# Patient Record
Sex: FEMALE | Race: WHITE | Employment: OTHER | URBAN - METROPOLITAN AREA
[De-identification: names, ages, dates, MRNs, and addresses within clinical notes are randomized per-mention and may not be internally consistent; named-entity substitution may affect disease eponyms.]

---

## 2017-07-20 PROBLEM — M54.2 NECK PAIN: Status: ACTIVE | Noted: 2017-07-20

## 2017-07-20 PROBLEM — M54.12 CERVICAL RADICULOPATHY: Status: ACTIVE | Noted: 2017-07-20

## 2017-08-24 ENCOUNTER — HOSPITAL ENCOUNTER (OUTPATIENT)
Dept: MRI IMAGING | Age: 47
Discharge: HOME | End: 2017-08-24
Attending: HOSPITALIST
Payer: COMMERCIAL

## 2017-08-24 ENCOUNTER — APPOINTMENT (OUTPATIENT)
Dept: MRI IMAGING | Age: 47
End: 2017-08-24
Attending: HOSPITALIST
Payer: COMMERCIAL

## 2017-08-24 DIAGNOSIS — M54.2 NECK PAIN: ICD-10-CM

## 2017-08-24 PROCEDURE — 72141 MRI NECK SPINE W/O DYE: CPT

## 2017-08-31 ENCOUNTER — HOSPITAL ENCOUNTER (OUTPATIENT)
Dept: CT IMAGING | Age: 47
Discharge: HOME | End: 2017-08-31
Attending: HOSPITALIST
Payer: COMMERCIAL

## 2017-08-31 ENCOUNTER — HOSPITAL ENCOUNTER (OUTPATIENT)
Dept: INTERVENTIONAL RADIOLOGY/VASCULAR | Age: 47
Discharge: HOME | End: 2017-08-31
Attending: HOSPITALIST
Payer: COMMERCIAL

## 2017-08-31 VITALS
HEART RATE: 67 BPM | HEIGHT: 66 IN | BODY MASS INDEX: 23.24 KG/M2 | TEMPERATURE: 98.1 F | DIASTOLIC BLOOD PRESSURE: 77 MMHG | RESPIRATION RATE: 16 BRPM | OXYGEN SATURATION: 100 % | SYSTOLIC BLOOD PRESSURE: 136 MMHG

## 2017-08-31 DIAGNOSIS — R29.6 FREQUENT FALLS: ICD-10-CM

## 2017-08-31 DIAGNOSIS — M54.2 CERVICALGIA: ICD-10-CM

## 2017-08-31 PROCEDURE — 74011000250 HC RX REV CODE- 250: Performed by: PHYSICIAN ASSISTANT

## 2017-08-31 PROCEDURE — 74011636320 HC RX REV CODE- 636/320: Performed by: PHYSICIAN ASSISTANT

## 2017-08-31 PROCEDURE — 72126 CT NECK SPINE W/DYE: CPT

## 2017-08-31 PROCEDURE — 77030014143 HC TY PUNC LUMBR BD -A

## 2017-08-31 PROCEDURE — 62302 MYELOGRAPHY LUMBAR INJECTION: CPT

## 2017-08-31 PROCEDURE — 77030003666 HC NDL SPINAL BD -A

## 2017-08-31 RX ORDER — LIDOCAINE HYDROCHLORIDE 20 MG/ML
20-300 INJECTION, SOLUTION INFILTRATION; PERINEURAL
Status: DISCONTINUED | OUTPATIENT
Start: 2017-08-31 | End: 2017-09-04 | Stop reason: HOSPADM

## 2017-08-31 RX ADMIN — LIDOCAINE HYDROCHLORIDE 60 MG: 20 INJECTION, SOLUTION INFILTRATION; PERINEURAL at 11:35

## 2017-08-31 RX ADMIN — IOPAMIDOL 15 ML: 612 INJECTION, SOLUTION INTRATHECAL at 11:36

## 2017-08-31 NOTE — PROGRESS NOTES
TRANSFER - IN REPORT:    Verbal report received from Stephenie RN(name) on Stephanie Murray  being received from IR(unit) for routine progression of care      Report consisted of patients Situation, Background, Assessment and   Recommendations(SBAR). Information from the following report(s) Procedure Summary and MAR was reviewed with the receiving nurse. Opportunity for questions and clarification was provided. Assessment completed upon patients arrival to unit and care assumed.

## 2017-08-31 NOTE — PROGRESS NOTES
Recovery period without difficulty. Pt alert and oriented and denies pain. Dressing is clean, dry, and intact. Reviewed discharge instructions with patient , verbalized understanding. Pt escorted to lobby discharge area via wheelchair. Vital signs  completed.

## 2017-08-31 NOTE — DISCHARGE INSTRUCTIONS
[address]  Department of Interventional Radiology  [facility]  [phone] Office  [phone] Fax  POST LUMBAR PUNCTURE/MYELOGRAM/INTRATHECAL CHEMOTHERAPY DISCHARGE INSTRUCTIONS  General Information:  Lumbar Puncture: A LP is done to help diagnose several disorders, like pseudo tumor, migraines, meningitis, and multiple sclerosis. It involves a puncture (usually in the lower spine) into the sac that protects the spinal column. A sample of the fluid in that space is removed and tested in the lab. Myelogram:   A Myelogram involves a lumbar puncture, and instead of removing fluid, contrast will be injected into the sac surrounding the spinal column. It is done to visualize the spinal column, nerve roots, spinal canal, vertebral discs and disc space. It is usually done to diagnose back pain with unknown cause or in preparation for surgery. After the injection, a CT scan will be done, usually within two hours of the injection. Intrathecal Chemotherapy:   Chemotherapy can be given in many forms. Intrathecal chemo involves a lumbar puncture, and instead of removing fluid, the chemo will be injected into the space. After any of these procedures, you will be asked to lie flat on your back for 4-6 hours to prevent complications. You should also rest for 24 hours after you go home, and force fluids. If you have a headache, you should take Tylenol or acetaminophen. Call If:   You should call your Physician and/or the Radiology Nurse if you develop a headache that is not relieved by Tylenol, and worsens when you stand and eases when you lie down, you need to call. You may have developed what is referred to as a spinal headache. Our physicians will probably advise you to be on strict bed rest for 24 hours, to drink lots of fluids and caffeine. If this does not help the head pain, call again the next day.  You should call if you have bleeding other than a small spot on your bandage. You should call if you have any numbness, tingling, weakness, fever, chills, urinary retention, severe itching, rash, welts, swelling, or confusion. Follow-up Instructions: See the doctor who ordered your procedure as he/she has instructed. If you had a Lumbar Puncture or Myelogram, your results should be available to your ordering doctor in 3-5 business days. You can remove your dressing in 24 hours and shower regularly. Do not bathe or swim for 72 hours. To Reach Us: If you have any questions about your procedure, please call the Interventional Radiology department at [phone]. After business hours (5pm) and weekends, call the answering service at [phone] and ask for the Radiologist on call to be paged. Patient Signature:  Date: 8/31/2017  Discharging Nurse: Issa Silva RN      Interventional Radiology General Nurse Discharge    After general anesthesia or intravenous sedation, for 24 hours or while taking prescription Narcotics:  · Limit your activities  · Do not drive and operate hazardous machinery  · Do not make important personal or business decisions  · Do  not drink alcoholic beverages  · If you have not urinated within 8 hours after discharge, please contact your surgeon on call. * Please give a list of your current medications to your Primary Care Provider. * Please update this list whenever your medications are discontinued, doses are     changed, or new medications (including over-the-counter products) are added. * Please carry medication information at all times in case of emergency situations. These are general instructions for a healthy lifestyle:    No smoking/ No tobacco products/ Avoid exposure to second hand smoke  Surgeon General's Warning:  Quitting smoking now greatly reduces serious risk to your health.     Obesity, smoking, and sedentary lifestyle greatly increases your risk for illness  A healthy diet, regular physical exercise & weight monitoring are important for maintaining a healthy lifestyle    You may be retaining fluid if you have a history of heart failure or if you experience any of the following symptoms:  Weight gain of 3 pounds or more overnight or 5 pounds in a week, increased swelling in our hands or feet or shortness of breath while lying flat in bed. Please call your doctor as soon as you notice any of these symptoms; do not wait until your next office visit. Recognize signs and symptoms of STROKE:  F-face looks uneven    A-arms unable to move or move unevenly    S-speech slurred or non-existent    T-time-call 911 as soon as signs and symptoms begin-DO NOT go       Back to bed or wait to see if you get better-TIME IS BRAIN.

## 2017-08-31 NOTE — IP AVS SNAPSHOT
Belen Julio 
 
 
 [address] [facility] 
[phone] Patient: Keyona Dolan MRN: [id number] HIC:[birth year] You are allergic to the following Allergen Reactions Morphine Palpitations \"I have chest pain\" Aspirin Other (comments) Was told she could get ulcers if she takes ASA Dilaudid (Hydromorphone (Bulk)) Nausea and Vomiting Toradol (Ketorolac Tromethamine) Unknown (comments) \"I don't remember. Seems like I break out\" Recent Documentation Height Breastfeeding? BMI OB Status Smoking Status 1.676 m No 23.24 kg/m2 Hysterectomy Former Smoker Emergency Contacts Name Discharge Info Relation Home Work Mobile Juan Cherry  Mother [14] [phone] About your hospitalization You were admitted on:  August 31, 2017 You last received care in the:  [facility] You were discharged on:  August 31, 2017 Unit phone number:  [phone] Why you were hospitalized Your primary diagnosis was:  Not on File Providers Seen During Your Hospitalizations Provider Role Specialty Primary office phone Justyna Melgar MD Attending Provider Internal Medicine [phone] Your Primary Care Physician (PCP) Primary Care Physician Office Phone Office Fax  
 [address] [phone] Follow-up Information None Your Appointments Thursday August 31, 2017  1:00 PM EDT  
CT POST MYELO with [facility] CT 59 SLICE UNIT 1  
[facility] RADIOLOGY CT SCAN ([address] [place] [address]) [address] [facility]  
[phone]  
  
   
 2nd Part of Interventional Radiology procedure. Scan completed in CT department. Referring Physicians: 1) Fax H&P/recent office notes and order to Interventional Radiology.  Lab work not typically required unless Pt condition dictates. 2)Patients with contrast dye allergies must be pre medicated prior to arrival. 3) Obtain clearance to hold blood thinners from prescribing Physician and give Patient instructions prior to arrival. 4) Continue medications and arrive well hydrated. 6) Pt to arrive 45 minutes early. This is a non sedation procedure. 7) Responsible adult  required to drive Patient home after recovery period. Recovery period can vary depending on patient condition. 8) Interventional Radiology Scheduling can be contacted at [phone] Wednesday September 06, 2017  8:40 AM EDT  
20 MIN APPT with Sudhir Anderson MD  
[facility] ([facility]) [address] [address] [place], [place] [address] [address]  
[phone] Current Discharge Medication List  
  
ASK your doctor about these medications Dose & Instructions Dispensing Information Comments Morning Noon Evening Bedtime  
 doxepin 50 mg capsule Commonly known as:  SINEquan Your last dose was: Your next dose is: Take  by mouth nightly. Refills:  0  
     
   
   
   
  
 gabapentin 300 mg capsule Commonly known as:  NEURONTIN Your last dose was: Your next dose is:    
   
   
 Dose:  300 mg Take 300 mg by mouth three (3) times daily. Refills:  0 HYDROcodone-acetaminophen  mg tablet Commonly known as:  Olivia Dimes Your last dose was: Your next dose is:    
   
   
 Dose:  1 Tab Take 1 Tab by mouth two (2) times a day. Refills:  0 LYRICA 150 mg capsule Generic drug:  pregabalin Your last dose was: Your next dose is:    
   
   
 Dose:  150 mg Take 150 mg by mouth two (2) times a day. Indications: pain Refills:  0  
     
   
   
   
  
 OPANA 10 mg Tab tablet Generic drug:  oxyMORphone Your last dose was: Your next dose is:    
   
   
 Dose:  20 mg Take 20 mg by mouth two (2) times a day. Refills:  0 SEROquel 400 mg tablet Generic drug:  QUEtiapine Your last dose was: Your next dose is:    
   
   
 Dose:  400 mg Take 400 mg by mouth every twenty-four (24) hours. Refills:  0  
     
   
   
   
  
 tiZANidine 4 mg tablet Commonly known as:  Yi Hector Your last dose was: Your next dose is:    
   
   
 Dose:  4 mg Take 4 mg by mouth three (3) times daily as needed. Indications: MUSCLE SPASM Refills:  0  
     
   
   
   
  
 TOPAMAX 100 mg tablet Generic drug:  topiramate Your last dose was: Your next dose is:    
   
   
 Dose:  150 mg Take 150 mg by mouth two (2) times a day. Refills:  0  
     
   
   
   
  
 venlafaxine 100 mg tablet Commonly known as:  [facility] Your last dose was: Your next dose is:    
   
   
 Dose:  225 mg Take 225 mg by mouth daily. Indications: GENERALIZED ANXIETY DISORDER, pm  
 Refills:  0  
     
   
   
   
  
 VISTARIL 25 mg capsule Generic drug:  hydrOXYzine pamoate Your last dose was: Your next dose is:    
   
   
 Dose:  25 mg Take 25 mg by mouth three (3) times daily as needed for Itching. Refills:  0 Discharge Instructions [address] Department of Interventional Radiology [facility] 
[phone] Office 
[phone] Fax POST LUMBAR PUNCTURE/MYELOGRAM/INTRATHECAL CHEMOTHERAPY DISCHARGE INSTRUCTIONS General Information: 
Lumbar Puncture: A LP is done to help diagnose several disorders, like pseudo tumor, migraines, meningitis, and multiple sclerosis. It involves a puncture (usually in the lower spine) into the sac that protects the spinal column. A sample of the fluid in that space is removed and tested in the lab. Myelogram: A Myelogram involves a lumbar puncture, and instead of removing fluid, contrast will be injected into the sac surrounding the spinal column. It is done to visualize the spinal column, nerve roots, spinal canal, vertebral discs and disc space. It is usually done to diagnose back pain with unknown cause or in preparation for surgery. After the injection, a CT scan will be done, usually within two hours of the injection. Intrathecal Chemotherapy: 
 Chemotherapy can be given in many forms. Intrathecal chemo involves a lumbar puncture, and instead of removing fluid, the chemo will be injected into the space. After any of these procedures, you will be asked to lie flat on your back for 4-6 hours to prevent complications. You should also rest for 24 hours after you go home, and force fluids. If you have a headache, you should take Tylenol or acetaminophen. Call If: 
 You should call your Physician and/or the Radiology Nurse if you develop a headache that is not relieved by Tylenol, and worsens when you stand and eases when you lie down, you need to call. You may have developed what is referred to as a spinal headache. Our physicians will probably advise you to be on strict bed rest for 24 hours, to drink lots of fluids and caffeine. If this does not help the head pain, call again the next day. You should call if you have bleeding other than a small spot on your bandage. You should call if you have any numbness, tingling, weakness, fever, chills, urinary retention, severe itching, rash, welts, swelling, or confusion. Follow-up Instructions: See the doctor who ordered your procedure as he/she has instructed. If you had a Lumbar Puncture or Myelogram, your results should be available to your ordering doctor in 3-5 business days. You can remove your dressing in 24 hours and shower regularly. Do not bathe or swim for 72 hours. To Reach Us:  If you have any questions about your procedure, please call the Interventional Radiology department at [phone]. After business hours (5pm) and weekends, call the answering service at [phone] and ask for the Radiologist on call to be paged. Patient Signature: 
Date: 8/31/2017 Discharging Nurse: Vani Oliveros RN Interventional Radiology General Nurse Discharge After general anesthesia or intravenous sedation, for 24 hours or while taking prescription Narcotics: · Limit your activities · Do not drive and operate hazardous machinery · Do not make important personal or business decisions · Do  not drink alcoholic beverages · If you have not urinated within 8 hours after discharge, please contact your surgeon on call. * Please give a list of your current medications to your Primary Care Provider. * Please update this list whenever your medications are discontinued, doses are 
   changed, or new medications (including over-the-counter products) are added. * Please carry medication information at all times in case of emergency situations. These are general instructions for a healthy lifestyle: No smoking/ No tobacco products/ Avoid exposure to second hand smoke Surgeon General's Warning:  Quitting smoking now greatly reduces serious risk to your health. Obesity, smoking, and sedentary lifestyle greatly increases your risk for illness A healthy diet, regular physical exercise & weight monitoring are important for maintaining a healthy lifestyle You may be retaining fluid if you have a history of heart failure or if you experience any of the following symptoms:  Weight gain of 3 pounds or more overnight or 5 pounds in a week, increased swelling in our hands or feet or shortness of breath while lying flat in bed. Please call your doctor as soon as you notice any of these symptoms; do not wait until your next office visit. Recognize signs and symptoms of STROKE: 
F-face looks uneven A-arms unable to move or move unevenly S-speech slurred or non-existent T-time-call 911 as soon as signs and symptoms begin-DO NOT go Back to bed or wait to see if you get better-TIME IS BRAIN. Discharge Orders None Introducing [facility]! [facility] introduces Social 2 Step patient portal. Now you can access parts of your medical record, email your doctor's office, and request medication refills online. 1. In your internet browser, go to [email or web address] 2. Click on the First Time User? Click Here link in the Sign In box. You will see the New Member Sign Up page. 3. Enter your Social 2 Step Access Code exactly as it appears below. You will not need to use this code after youve completed the sign-up process. If you do not sign up before the expiration date, you must request a new code. · Social 2 Step Access Code: [email or web address] Expires: 10/1/2017 11:03 AM 
 
4. Enter the last four digits of your Social Security Number (xxxx) and Date of Birth (mm/dd/yyyy) as indicated and click Submit. You will be taken to the next sign-up page. 5. Create a Social 2 Step ID. This will be your Social 2 Step login ID and cannot be changed, so think of one that is secure and easy to remember. 6. Create a Social 2 Step password. You can change your password at any time. 7. Enter your Password Reset Question and Answer. This can be used at a later time if you forget your password. 8. Enter your e-mail address. You will receive e-mail notification when new information is available in [address] 9. Click Sign Up. You can now view and download portions of your medical record. 10. Click the Download Summary menu link to download a portable copy of your medical information. If you have questions, please visit the Frequently Asked Questions section of the Social 2 Step website. Remember, Social 2 Step is NOT to be used for urgent needs. For medical emergencies, dial 911. Now available from your iPhone and Android! General Information Please provide this summary of care documentation to your next provider. Patient Signature:  ____________________________________________________________ Date:  ____________________________________________________________  
  
Arna [place] Provider Signature:  ____________________________________________________________ Date:  ____________________________________________________________

## 2017-08-31 NOTE — PROGRESS NOTES
TRANSFER - OUT REPORT:    Verbal report given to Marilu(name) on Elham Edwards  being transferred to recovery(unit) for routine progression of care       Report consisted of patients Situation, Background, Assessment and   Recommendations(SBAR). Information from the following report(s) SBAR and Procedure Summary was reviewed with the receiving nurse. Lines:       Opportunity for questions and clarification was provided.       Patient transported with:   Registered Nurse

## 2017-08-31 NOTE — PROCEDURES
Department of Interventional Radiology  [phone]        Interventional Radiology Brief Procedure Note    Patient: Phillip Osorio MRN: [id number]  SSN: [id number]    YOB: 1970  Age: 49 y.o. Sex: female      Date of Procedure: 8/31/2017    Pre-Procedure Diagnosis: neck pain    Post-Procedure Diagnosis: SAME    Procedure(s): Myelogram    Brief Description of Procedure: Fluoro guided LP, contrast injected, needle removed    Performed By: Ori Drew PA-C     Assistants: None    Anesthesia:None    Estimated Blood Loss: None    Specimens: None    Implants: None    Findings: See dictated report.      Complications: None    Recommendations: bedrest, CT     Follow Up: referring MD    Signed By: Ori Drew PA-C     August 31, 2017

## 2018-11-19 ENCOUNTER — HOSPITAL ENCOUNTER (OUTPATIENT)
Dept: CT IMAGING | Age: 48
Discharge: HOME | End: 2018-11-19
Attending: PSYCHIATRY & NEUROLOGY
Payer: COMMERCIAL

## 2018-11-19 DIAGNOSIS — G81.94 HEMIPARESIS OF LEFT NONDOMINANT SIDE DUE TO NON-CEREBROVASCULAR ETIOLOGY (HCC): ICD-10-CM

## 2018-11-19 LAB — CREAT BLD-MCNC: 0.6 MG/DL (ref 0.8–1.5)

## 2018-11-19 PROCEDURE — 74011000258 HC RX REV CODE- 258: Performed by: PSYCHIATRY & NEUROLOGY

## 2018-11-19 PROCEDURE — 82565 ASSAY OF CREATININE: CPT

## 2018-11-19 PROCEDURE — 74011636320 HC RX REV CODE- 636/320: Performed by: PSYCHIATRY & NEUROLOGY

## 2018-11-19 PROCEDURE — 70496 CT ANGIOGRAPHY HEAD: CPT

## 2018-11-19 RX ORDER — SODIUM CHLORIDE 0.9 % (FLUSH) 0.9 %
10 SYRINGE (ML) INJECTION
Status: COMPLETED | OUTPATIENT
Start: 2018-11-19 | End: 2018-11-19

## 2018-11-19 RX ADMIN — Medication 10 ML: at 10:14

## 2018-11-19 RX ADMIN — IOPAMIDOL 100 ML: 755 INJECTION, SOLUTION INTRAVENOUS at 10:14

## 2018-11-19 RX ADMIN — SODIUM CHLORIDE 100 ML: 900 INJECTION, SOLUTION INTRAVENOUS at 10:14

## 2018-11-28 ENCOUNTER — HOSPITAL ENCOUNTER (OUTPATIENT)
Dept: NON INVASIVE DIAGNOSTICS | Age: 48
Discharge: HOME | End: 2018-11-28
Attending: PSYCHIATRY & NEUROLOGY
Payer: COMMERCIAL

## 2018-11-28 DIAGNOSIS — G81.94 HEMIPLEGIA OF LEFT NONDOMINANT SIDE DUE TO NONCEREBROVASCULAR ETIOLOGY, UNSPECIFIED HEMIPLEGIA TYPE (HCC): ICD-10-CM

## 2018-11-28 PROCEDURE — 95816 EEG AWAKE AND DROWSY: CPT

## 2018-11-28 NOTE — PROCEDURES
[facility]   Routine Electroencephalogram Report      DATE: November 28, 2018; [id number]    EEG Number:  [id number]    Indication:  Left hemiplegia    Medications:   Current Outpatient Medications   Medication Sig Dispense Refill    clopidogrel (PLAVIX) 75 mg tab Take 75 mg by mouth.  thiamine HCL (B-1) 100 mg tablet Take  by mouth daily.  venlafaxine-SR (EFFEXOR XR) 150 mg capsule Take  by mouth daily. 2 caps in the am      omeprazole (PRILOSEC) 20 mg capsule Take 20 mg by mouth two (2) times a day.  promethazine (PHENERGAN) 25 mg tablet Take 25 mg by mouth every six (6) hours as needed for Nausea. .5 TO 1 TAB AS NEEDED      gabapentin (NEURONTIN) 400 mg capsule Take 1 Cap by mouth three (3) times daily. Indications: NEUROPATHIC PAIN (Patient taking differently: Take 600 mg by mouth three (3) times daily.) 270 Cap 1    hydrOXYzine pamoate (VISTARIL) 25 mg capsule Take 25 mg by mouth three (3) times daily as needed for Itching.  topiramate (TOPAMAX) 100 mg tablet Take 100 mg by mouth two (2) times a day.  QUEtiapine (SEROQUEL) 400 mg tablet Take 800 mg by mouth nightly.  doxepin (SINEQUAN) 50 mg capsule Take  by mouth nightly.  oxyMORphone (OPANA) 10 mg tab tablet Take 20 mg by mouth two (2) times a day.  HYDROcodone-acetaminophen (NORCO)  mg tablet Take 1 Tab by mouth two (2) times a day.  pregabalin (LYRICA) 150 mg capsule Take 150 mg by mouth two (2) times a day. Indications: pain      venlafaxine (EFFEXOR) 100 mg tablet Take 225 mg by mouth daily. Indications: GENERALIZED ANXIETY DISORDER, pm      tiZANidine (ZANAFLEX) 4 mg tablet Take 6 mg by mouth three (3) times daily as needed. Technique: The EEG was recorded on a 32 channel [employer] ([employer] digital EEG machine. A full electrode headset was applied in accordance with the International 10-20 System of Electrode Placement. All impedances are less than 5000 Ohms.     State of Consciousness: awake and drowsy       Description: The waking EEG reveals intermittent 9-9.5 Hz, 30-50 µV rhythmic activity symmetrically in the posterior head regions bilaterally. Much EMG and eyeblink artifact is noted during wakefulness. During drowsiness intermittent semirhythmic mixed frequency slowing is present in frontocentral head regions. No focal slowing or epileptiform activity is identified.     Activation Procedures:  Hyperventilation: Was not performed   Photic Stimulation: Does not alter the record        Interpretation:normal EEG

## 2019-09-13 ENCOUNTER — HOSPITAL ENCOUNTER (OUTPATIENT)
Dept: GENERAL RADIOLOGY | Age: 49
Discharge: HOME | End: 2019-09-13
Attending: NURSE PRACTITIONER
Payer: MEDICARE

## 2019-09-13 ENCOUNTER — HOSPITAL ENCOUNTER (OUTPATIENT)
Dept: MRI IMAGING | Age: 49
Discharge: HOME | End: 2019-09-13
Attending: NURSE PRACTITIONER
Payer: MEDICARE

## 2019-09-13 DIAGNOSIS — R20.0 NUMBNESS: ICD-10-CM

## 2019-09-13 DIAGNOSIS — M79.603 UPPER EXTREMITY PAIN: ICD-10-CM

## 2019-09-13 DIAGNOSIS — M54.2 NECK PAIN: ICD-10-CM

## 2019-09-13 PROCEDURE — 73130 X-RAY EXAM OF HAND: CPT

## 2019-09-13 PROCEDURE — 73110 X-RAY EXAM OF WRIST: CPT

## 2019-09-13 PROCEDURE — 72141 MRI NECK SPINE W/O DYE: CPT

## 2020-01-30 ENCOUNTER — HOSPITAL ENCOUNTER (OUTPATIENT)
Dept: MRI IMAGING | Age: 50
Discharge: HOME | End: 2020-01-30
Attending: NEUROLOGICAL SURGERY
Payer: MEDICARE

## 2020-01-30 DIAGNOSIS — R51.9 HEAD ACHE: ICD-10-CM

## 2020-01-30 DIAGNOSIS — M62.81 MUSCLE WEAKNESS OF LEFT UPPER EXTREMITY: ICD-10-CM

## 2020-01-30 PROCEDURE — 70551 MRI BRAIN STEM W/O DYE: CPT
